# Patient Record
Sex: FEMALE | Race: WHITE | Employment: UNEMPLOYED | URBAN - METROPOLITAN AREA
[De-identification: names, ages, dates, MRNs, and addresses within clinical notes are randomized per-mention and may not be internally consistent; named-entity substitution may affect disease eponyms.]

---

## 2019-12-05 LAB — PAP SMEAR, EXTERNAL: NORMAL

## 2019-12-27 ENCOUNTER — OFFICE VISIT (OUTPATIENT)
Dept: PRIMARY CARE CLINIC | Age: 54
End: 2019-12-27
Payer: COMMERCIAL

## 2019-12-27 VITALS
BODY MASS INDEX: 24.63 KG/M2 | HEIGHT: 63 IN | OXYGEN SATURATION: 100 % | WEIGHT: 139 LBS | TEMPERATURE: 98 F | RESPIRATION RATE: 14 BRPM | DIASTOLIC BLOOD PRESSURE: 68 MMHG | SYSTOLIC BLOOD PRESSURE: 106 MMHG | HEART RATE: 83 BPM

## 2019-12-27 DIAGNOSIS — L71.9 ROSACEA: Primary | ICD-10-CM

## 2019-12-27 DIAGNOSIS — R21 RASH OF FACE: ICD-10-CM

## 2019-12-27 DIAGNOSIS — D68.51 HETEROZYGOUS FACTOR V LEIDEN MUTATION (HCC): ICD-10-CM

## 2019-12-27 DIAGNOSIS — B07.0 PLANTAR WART OF LEFT FOOT: ICD-10-CM

## 2019-12-27 PROCEDURE — 3017F COLORECTAL CA SCREEN DOC REV: CPT | Performed by: FAMILY MEDICINE

## 2019-12-27 PROCEDURE — G8420 CALC BMI NORM PARAMETERS: HCPCS | Performed by: FAMILY MEDICINE

## 2019-12-27 PROCEDURE — G8427 DOCREV CUR MEDS BY ELIG CLIN: HCPCS | Performed by: FAMILY MEDICINE

## 2019-12-27 PROCEDURE — 99202 OFFICE O/P NEW SF 15 MIN: CPT | Performed by: FAMILY MEDICINE

## 2019-12-27 PROCEDURE — G8484 FLU IMMUNIZE NO ADMIN: HCPCS | Performed by: FAMILY MEDICINE

## 2019-12-27 PROCEDURE — 1036F TOBACCO NON-USER: CPT | Performed by: FAMILY MEDICINE

## 2019-12-27 RX ORDER — METRONIDAZOLE 10 MG/G
GEL TOPICAL 2 TIMES DAILY
Qty: 1 TUBE | Refills: 1 | Status: SHIPPED | OUTPATIENT
Start: 2019-12-27 | End: 2021-04-15 | Stop reason: ALTCHOICE

## 2019-12-27 RX ORDER — DOXYCYCLINE HYCLATE 50 MG/1
50 CAPSULE ORAL 2 TIMES DAILY PRN
COMMUNITY
Start: 2019-09-20 | End: 2019-12-27

## 2019-12-27 ASSESSMENT — ENCOUNTER SYMPTOMS
SORE THROAT: 0
SHORTNESS OF BREATH: 0
NAUSEA: 0
ABDOMINAL PAIN: 0
COUGH: 0

## 2020-01-15 ENCOUNTER — TELEPHONE (OUTPATIENT)
Dept: PRIMARY CARE CLINIC | Age: 55
End: 2020-01-15

## 2020-01-31 ENCOUNTER — TELEPHONE (OUTPATIENT)
Dept: PRIMARY CARE CLINIC | Age: 55
End: 2020-01-31

## 2020-01-31 NOTE — TELEPHONE ENCOUNTER
Care Everywhere shows negative mammo at [facility] from 12/27/2019. Can we attribute this to update her HM? Thanks!

## 2020-02-25 PROBLEM — D22.9 MULTIPLE NEVI: Status: ACTIVE | Noted: 2020-02-25

## 2020-07-13 ENCOUNTER — TELEPHONE (OUTPATIENT)
Dept: DERMATOLOGY | Age: 55
End: 2020-07-13

## 2020-07-13 NOTE — TELEPHONE ENCOUNTER
np  Pt c/b [phone]  Pt states:   - has a referral   - needs to be seen for a mole  Please call to discuss thanks

## 2020-07-14 NOTE — TELEPHONE ENCOUNTER
Call from [place] with [facility], patient has been denied for SNF services for: insurance reasons and unable to accepted [facility] at this time.    Was referred to [facility] by Christianne Guerra for Multiple nevi 02/25/2020

## 2020-07-23 NOTE — TELEPHONE ENCOUNTER
Please make sure patient knows that I will address her rosacea if that is the most pressing and schedule a TBSE another visit.     Thanks,  [address] [place]

## 2020-07-23 NOTE — TELEPHONE ENCOUNTER
Dr. Dipti Magaña patient    Patient called back and wanted a face to face. She is scheduled in October.     Call back # [phone]

## 2020-10-14 ENCOUNTER — OFFICE VISIT (OUTPATIENT)
Dept: DERMATOLOGY | Age: 55
End: 2020-10-14
Payer: COMMERCIAL

## 2020-10-14 VITALS — TEMPERATURE: 97.4 F

## 2020-10-14 PROCEDURE — 99243 OFF/OP CNSLTJ NEW/EST LOW 30: CPT | Performed by: DERMATOLOGY

## 2020-10-14 NOTE — PATIENT INSTRUCTIONS
What is rosacea? Rosacea is a skin condition that causes redness and raised, red bumps on the cheeks, nose, chin, forehead, or eyelids. Rosacea is a long-term condition that can get worse over time. Rosacea happens most often in adults ages 27 to 61. What are the symptoms of rosacea? Rosacea affects the cheeks, nose, chin, forehead, or eyelids. Symptoms include:    ?Redness picture 1    ? Blushing easily    ? Raised, red bumps with or without pus in them  - Bumps from rosacea can sometimes look like acne, but they aren't acne. Picture 2    ? Tiny, swollen blood vessels on the skin (called \"telangiectasias\")    ? A burning or gritty feeling in the eyes    ? A red, swollen, and rounded nose    Sometimes, people's symptoms are under control. Other times, symptoms worsen and flare up. There are some things that might make redness on the face worse. Examples include:    ?Eating hot or spicy foods, or drinking hot drinks    ? Drinking alcohol    ? Being too hot or cold    ? Sunlight    ? Stress and other strong emotions    Is there a test for rosacea? No. There is no test. But your doctor or nurse should be able to tell if you have it by learning about your symptoms and doing an exam.    How is rosacea treated? Treatment for rosacea has 2 parts. The treatments do not cure rosacea, but they help control symptoms and prevent flare-ups. Treatment involves both:    ?Medicines - Doctors can use different medicines to treat rosacea. The medicines can come as gels, creams, or lotions that go on your skin, or as pills that you swallow. You will likely need to take or use medicines for a long time. ? Lifestyle changes - To help control your symptoms and prevent flare-ups, you should:    Avoid the common triggers listed above and any other triggers that you know worsen your symptoms    Use mild, unscented face cleansers to wash your face    Wear sunscreen every day    Avoid using products on your face with alcohol, questions. Skin Cancer Prevention: After Your Visit    Skin cancer is the abnormal growth of cells in the skin. It usually appears as a growth that changes in color, shape, or size. This can be a sore that does not heal or a change in a wart or a mole. Skin cancer is almost always curable when found early and treated. So it is important to see your doctor if you have any of these changes in your skin. Skin cancer is the most common type of cancer. It often appears on areas of the body that have been exposed to the sun, such as the head, face, neck, back, chest, or shoulders. Follow-up care is a key part of your treatment and safety. Be sure to make and go to all appointments, and call your doctor if you are having problems. It's also a good idea to know your test results and keep a list of the medicines you take. How can you care for yourself at home?  - Wear a wide-brimmed hat and long sleeves and pants if you are going to be outdoors for a long time. - Avoid the sun between 10 a.m. and 4 p.m., which is the peak time for UV rays. - Wear sunscreen on exposed skin. Make sure the sunscreen blocks ultraviolet rays (both UVA and UVB) and has a sun protection factor (SPF) of at least 30. Use it every day, even when it is cloudy. Some doctors may recommend a higher SPF, such as 48.  - Do not use tanning booths or sunlamps. - Use lip balm or cream that has sun protection factor (SPF) to protect your lips from getting sunburned or getting cold sores. - Wear sunglasses that block UV rays. When should you call for help? Watch closely for changes in your health, and be sure to contact your doctor if:  - You are concerned about any problem areas on your skin. - You notice a change in a mole or skin growth. For example:  a. It gets bigger. b. It develops uneven borders. c. It gets thicker, raised, or worn down. d. It changes color. e. It starts to bleed easily.

## 2020-10-15 NOTE — PROGRESS NOTES
Patient's Name: Marguerite Jenkins  MRN: [id number]  YOB: 1965  Date of Visit: 10/14/2020  Primary Care Provider: Aldo Machado DO  Referring Provider: Sindi Kate*    Subjective:     Chief Complaint   Patient presents with    New Patient     multiple moles        History of Present Illness:  Marguerite Jenkins is a 60 y.o. female who presents in clinic today as a new patient seen in consultation request by Aldo Machado DO   for a full body skin examination and evaluation of moles . They have not undergone a full body skin examination in the past.     Patient reports having moles that have not been changing or bleeding. She also has rosacea that has previously been treated with metrogel. She reports her face recently flared, but she thinks she is sensitive to many things and that is why her face flared. Past Dermatologic History:  Personal history of non melanoma skin cancer: no   Personal history of melanoma: no  Personal history of abnormal/dysplastic moles: no  Personal history of tanning bed use current: no  Personal history of tanning bed use: No  Personal history of blistering sunburns: No  Personal history of extensive sun exposure: Yes  Burns easily: No  Practicing sun protective habits:  Yes using  sunscreen SPF       Social History:   Occupation Current or Former: full time job as    Marital status:   Smoking Status: Never smoker  Children: Yes 1 daughter   Type of outdoor activities if any : none except walking:       Family Skin Disease History:  Patient reports  a family history of non melanoma skin cancer. mother, unsure what type  Patient denies  a family history of abnormal moles  Patient denies  an immediate family history of melanoma.      Past Medical History:  Past Medical History:   Diagnosis Date    [facility]    Rosacea        Past Surgical History:  No past surgical history on file. Past Family History:  Family History   Problem Relation Age of Onset    Cancer Father         prostate cancer    Depression Father     Hearing Loss Father     Stroke Father     Cancer Mother         uterine, breast, squamous cell carcinoma    High Blood Pressure Mother     High Cholesterol Mother     No Known Problems Sister     Cancer Maternal Grandfather 48        skin cancer    Stroke Maternal Grandfather        Allergies: Allergies   Allergen Reactions    Aspirin Other (See Comments)     Reported as child    Doxycycline Nausea Only    Penicillins Hives    Sulfa Antibiotics Nausea And Vomiting       Current Medications:  Current Outpatient Medications   Medication Sig Dispense Refill    metroNIDAZOLE (METROGEL) 1 % gel Apply topically 2 times daily Apply topically daily. 1 Tube 1     No current facility-administered medications for this visit. Review of Systems:  Constitutional: No fevers, chills or recent illness. Skin: Skin:As per HPI AND otherwise no new, bleeding or symptomatic skin lesions      Objective:     Vitals:    10/14/20 0924   Temp: 97.4 °F (36.3 °C)   TempSrc: Temporal     Physical Examination:  General: alert, comfortable, no apparent distress, well-appearing  Psych: alert, oriented and pleasant  Neuro: oriented to person, place, and time  Skin: Areas examined: head including face, lips, conjunctiva and lids, neck, hair/scalp, chest, including breasts and axilla, abdomen, back, buttocks, right upper extremity, left upper extremity, right lower extremity, left lower extremity, left hand, right hand, digits and nails, Right foot, Left foot, toe nails and groin      All areas examined were within normal limits except those listed below with the appropriate assessment and plan    Assessment and Plan (with relevant objective exam findings):     1.  Acne rosacea, erythematotelangiectatic  Location: central face, perinasal and chin  Objective findings: Erythema and telangiectasia with scattered papules pustules    Discussed that this can be chronic and control of the inflammation is key to control of the disease. To do this, triggers must be avoided including sun exposure, extreme temperatures, hot drinks, caffeine, alcohol, spicy foods, irritating skin care products or sunscreens (physical blocking sunscreens with zinc and titanium are best), and any situation that causes flushing. Treatments usually include topical antibiotics and antiinflammatory medications, oral antibiotics, and laser therapy. After discussing r/b/a decision was made to treat with the following:      - Ivermectin (Soolantra) 1% cream daily (samples provided)  - Patient declined oral antibiotics. 2. Lipoma  Location: Rt inferior buttock at the crease  Objective findings: subcutaneous rubbery nodule which is not painful to palpation. Patient was reassured of its benign nature, and that if it grows or becomes tender, it should be reassessed and can be excised. 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 [place] protection is key to prevent further damage. The skin changes are benign and can be treated cosmetically with either IPL or Pulsed Dye Laser      4. Seborrheic Keratosis  Location: back and chest  Objective: Waxy, stuck on keratotic brown and tan  papules. - Discussed the benign nature of these lesions and that there is no malignant potential.  Treatment is destructive or removal which would be considered cosmetic and not covered by insurance. Treatment today: reassurance.       5. Actinodermatosis  In all photo-exposed areas there were numerous light brown, lacy, 3-6 mm macules and some mottled hypo- and hyperpigmentation. This was most prominent on the face, anterior chest, and shoulders. The patient was reassured that these changes do not require treatment, but indicated a significant amount of sun damage in the patient's past. The patient was briefly counseled on the importance of sun protective habits and encouraged to be seen for follow up evaluations in the future. ABCDEs of melanoma were reviewed. 6. Solar Lentigo  Location: sun exposed areas, most prominently on the face, forearms, neck and shoulders      Objective: Numerous lacy brown macules ranging in size from 2-10 mm diameter. The lentigines seen today are benign in character, caused by the sun, and do not require treatment. However, they do occasionally transform into a malignancy, so the patient needs to monitor for changes. They were educated on what a suspicious change would include, change in size or color, and included education on the ABCDs of melanoma. These lesions can also be removed for cosmetic purposes with chemical peels, laser or cryosurgery for a cosmetic fee if the patient desires. 7.  Plantar warts    Location: Left medial heel  Objective: exophytic, verrucous, with dilated capillary loops, rough and scaly hyperkeratotic flesh-colored papules with disruption of skin markings. Discussed that warts are caused by a virus and can be spread with trauma to skin or when the skin is wet. Warts may disappear without treatment in some cases. Discussed treatments that include destructive therapies (freezing), keratolytics (MediPlast, salicylic acid), immunotherapy (Aldara),and possible oral therapy such as L-methionine. It is likely that several treatment types and several sessions will be needed for complete clearance. After discussion of treatment options, the patient declined any treatment.       8. Hand Dermatitis  Location: palms and palmar aspect of fingers   Objective findings: lichenification, leathery texture, scale and fissures     There are many causes of hand rashes. Some of the most common include detergents, soaps, overexposure to water and dry air (especially in the winter), rubber gloves, nd ingredients in skin and personal care products. Excess sweating can also be a problem sometimes. Treatment options were discussed including topical steroids, oral immunosuppressive therapies, and avoidance of triggers. After discussing treatment options, risks and benefits, patient declined treatment and will continue with her own creams she uses. Use soap sparingly, and remove jewelry to wash hands. Pat skin dry and apply moisturizer immediately after drying hands. Thicker moisturizers (vaseline jelly, aquaphor, eucerin cream) that come in a tub will almost always work better than lotions. - The importance of continued surveillance was discussed. Monthly self examinations were encouraged. - Signs of cutaneous malignancy were discussed and they were encouraged to contact me if they note any evolving, bleeding, painful or non-healing lesions. ABCDEs of melanoma also reviewed. - Photoprotection was encouraged and written material on sunscreen provided. I spent a minimum of 45 minutes interacting face-to-face with this patient. More than 50% of this time was spent counseling and coordinating care for the patient  regarding their disease(s) and therapies.  This included reviewing the patients chief complaint, reviewing and clarifying the patients present illness, reviewing and clarifying the patients past medical history, reviewing and clarifying the patients review of systems/social history/family history, reviewing and clarifying the patients current medications/drug allergies, reviewing and highlighting pertinent aspects of any available outside medical records, reviewing any available outside biopsy reports/biopsy slides, examining the patients skin, discussing with the patient my

## 2020-10-16 RX ORDER — IVERMECTIN 10 MG/G
CREAM TOPICAL
Qty: 45 G | Refills: 3 | Status: SHIPPED | OUTPATIENT
Start: 2020-10-16 | End: 2021-04-15 | Stop reason: ALTCHOICE

## 2021-01-22 ENCOUNTER — TELEPHONE (OUTPATIENT)
Dept: DERMATOLOGY | Age: 56
End: 2021-01-22

## 2021-01-22 NOTE — TELEPHONE ENCOUNTER
Called and spoke with patient. I advised patient that Proctor  is not here on that day. I offered patient several visit days to schedule. Patient is unable to schedule on those days due to she works [phone]. Patient stated that she will call back to schedule a visit.

## 2021-02-25 ENCOUNTER — TELEPHONE (OUTPATIENT)
Dept: PRIMARY CARE CLINIC | Age: 56
End: 2021-02-25

## 2021-02-25 NOTE — TELEPHONE ENCOUNTER
Message    Not seen pt in 1 year - please reach out and get scheduled if still seeing us.   ----- Message -----   From: Shiv Rico, [place]: 2/25/2021   9:26 AM EST   To: Anabel Gamboa DO   Subject: Scan                                               The image below was scanned by ARACELI Rdz on 2/25/2021 at 9:26 AM to the following: Curt Lofton [[id number]]:     Left message on patient's voice mail to call back.  She would be due for a physical.

## 2021-04-15 ENCOUNTER — OFFICE VISIT (OUTPATIENT)
Dept: PRIMARY CARE CLINIC | Age: 56
End: 2021-04-15
Payer: COMMERCIAL

## 2021-04-15 VITALS
DIASTOLIC BLOOD PRESSURE: 62 MMHG | OXYGEN SATURATION: 98 % | SYSTOLIC BLOOD PRESSURE: 96 MMHG | WEIGHT: 144.8 LBS | HEIGHT: 63 IN | BODY MASS INDEX: 25.66 KG/M2 | HEART RATE: 69 BPM | TEMPERATURE: 98.1 F

## 2021-04-15 DIAGNOSIS — Z00.00 ANNUAL PHYSICAL EXAM: Primary | ICD-10-CM

## 2021-04-15 DIAGNOSIS — Z12.11 SCREENING FOR COLON CANCER: ICD-10-CM

## 2021-04-15 PROCEDURE — 99396 PREV VISIT EST AGE 40-64: CPT | Performed by: FAMILY MEDICINE

## 2021-04-15 RX ORDER — IVERMECTIN 10 MG/G
CREAM TOPICAL
Qty: 45 G | Refills: 3
Start: 2021-04-15

## 2021-04-15 ASSESSMENT — ENCOUNTER SYMPTOMS
SORE THROAT: 0
NAUSEA: 0
COUGH: 0
ABDOMINAL PAIN: 0
SHORTNESS OF BREATH: 0

## 2021-04-15 ASSESSMENT — PATIENT HEALTH QUESTIONNAIRE - PHQ9
SUM OF ALL RESPONSES TO PHQ QUESTIONS 1-9: 0
1. LITTLE INTEREST OR PLEASURE IN DOING THINGS: 0
SUM OF ALL RESPONSES TO PHQ QUESTIONS 1-9: 0
SUM OF ALL RESPONSES TO PHQ9 QUESTIONS 1 & 2: 0

## 2021-04-15 NOTE — PROGRESS NOTES
[address] [address] [facility]  [address] [place] [id number]  Dept: [phone]  Dept Fax: [phone]     4/15/2021      Kaitlynn Vargas   1965     Chief Complaint   Patient presents with    Annual Exam       HPI  Pt comes in today for annual physical.    PHQ Scores 4/15/2021 2/25/2020   PHQ2 Score 0 0   PHQ9 Score 0 0     Interpretation of Total Score Depression Severity: 1-4 = Minimal depression, 5-9 = Mild depression, 10-14 = Moderate depression, 15-19 = Moderately severe depression, 20-27 = Severe depression     Prior to Visit Medications    Medication Sig Taking? Authorizing Provider   Ivermectin 1 % CREA Apply to affected areas on the face once daily Yes Ignacio Hurt DO       Past Medical History:   Diagnosis Date    Heterozygous factor V Leiden mutation ([facility] 12/27/2019    Rosacea         Social History     Tobacco Use    Smoking status: Never Smoker    Smokeless tobacco: Never Used   Substance Use Topics    Alcohol use: No    Drug use: No        History reviewed. No pertinent surgical history. Allergies   Allergen Reactions    Aspirin Other (See Comments)     Reported as child    Doxycycline Nausea Only    Penicillins Hives    Sulfa Antibiotics Nausea And Vomiting        Family History   Problem Relation Age of Onset    Cancer Father         prostate cancer    Depression Father     Hearing Loss Father     Stroke Father     Cancer Mother         uterine, breast, squamous cell carcinoma    High Blood Pressure Mother     High Cholesterol Mother     No Known Problems Sister     Cancer Maternal Grandfather 48        skin cancer    Stroke Maternal Grandfather         Patient's past medical history, surgical history, family history, medications, and allergies  were all reviewed and updated as appropriate today. Review of Systems   Constitutional: Negative for fever. HENT: Negative for ear pain and sore throat. Respiratory: Negative for cough and shortness of breath. Cardiovascular: Negative for chest pain. Gastrointestinal: Negative for abdominal pain and nausea. Skin: Negative for rash. Neurological: Negative for dizziness and headaches. Hematological: Negative for adenopathy. BP 96/62 (Cuff Size: Medium Adult)   Pulse 69   Temp 98.1 °F (36.7 °C) (Temporal)   Ht 5' 2.75\" (1.594 m)   Wt 144 lb 12.8 oz (65.7 kg)   LMP 08/17/2011   SpO2 98% Comment: room air  Breastfeeding No   BMI 25.85 kg/m²      Physical Exam  Vitals signs reviewed. Constitutional:       General: She is not in acute distress. HENT:      Head: Normocephalic. Nose: Nose normal.      Mouth/Throat:      Mouth: Mucous membranes are moist.   Eyes:      Extraocular Movements: Extraocular movements intact. Pupils: Pupils are equal, round, and reactive to light. Neck:      Musculoskeletal: Neck supple. Cardiovascular:      Rate and Rhythm: Normal rate and regular rhythm. Heart sounds: No murmur. Pulmonary:      Effort: Pulmonary effort is normal.      Breath sounds: Normal breath sounds. No wheezing. Abdominal:      General: Bowel sounds are normal.      Palpations: Abdomen is soft. Tenderness: There is no abdominal tenderness. Musculoskeletal:         General: No swelling or deformity. Lymphadenopathy:      Cervical: No cervical adenopathy. Skin:     General: Skin is warm and dry. Findings: No rash. Neurological:      Mental Status: She is alert and oriented to person, place, and time. Cranial Nerves: No cranial nerve deficit. Psychiatric:         Mood and Affect: Mood normal.         Behavior: Behavior is cooperative. Assessment:  Encounter Diagnoses   Name Primary?  Annual physical exam Yes    Screening for colon cancer        Plan:  1. Annual physical exam  General wellness exam. Reviewed chart for past hx and updated today.  Counseled on age appropriate health guidance and discussed screening recommendations. Vaccinations reviewed and discussed. All questions answered  - CBC Auto Differential; Future  - Comprehensive Metabolic Panel, Fasting; Future  - Lipid, Fasting; Future    2. Screening for colon cancer  Patient is due for colon cancer screening. At this time we have discussed the options that we have and what is recommended to screen. Patient would like to remain somewhat conservative and has agreed to at home FIT testing. I have provided the FIT kit and supplies for patient today, patient will return these to clinic. Patient is aware of what a negative and positive results of this test will mean. Patient is aware of the potential for colonoscopy in the future if this test is positive, and my further recommendations for more long-term colon cancer screening would be a colonoscopy. Return in about 1 year (around 4/15/2022) for Annual Physical.               Petrina Kussmaul, DO     Please note that this chart was generated using dragon dictation software. Although every effort was made to ensure the accuracy of this automated transcription, some errors in transcription may have occurred.

## 2021-04-20 DIAGNOSIS — Z00.00 ANNUAL PHYSICAL EXAM: ICD-10-CM

## 2021-04-20 LAB
BASOPHILS ABSOLUTE: 0.1 K/UL (ref 0–0.2)
BASOPHILS RELATIVE PERCENT: 1 %
EOSINOPHILS ABSOLUTE: 0.2 K/UL (ref 0–0.6)
EOSINOPHILS RELATIVE PERCENT: 4.2 %
HCT VFR BLD CALC: 42.3 % (ref 36–48)
HEMOGLOBIN: 14.2 G/DL (ref 12–16)
LYMPHOCYTES ABSOLUTE: 2.5 K/UL (ref 1–5.1)
LYMPHOCYTES RELATIVE PERCENT: 43.5 %
MCH RBC QN AUTO: 32.6 PG (ref 26–34)
MCHC RBC AUTO-ENTMCNC: 33.6 G/DL (ref 31–36)
MCV RBC AUTO: 97 FL (ref 80–100)
MONOCYTES ABSOLUTE: 0.6 K/UL (ref 0–1.3)
MONOCYTES RELATIVE PERCENT: 10.3 %
NEUTROPHILS ABSOLUTE: 2.3 K/UL (ref 1.7–7.7)
NEUTROPHILS RELATIVE PERCENT: 41 %
PDW BLD-RTO: 13.3 % (ref 12.4–15.4)
PLATELET # BLD: 249 K/UL (ref 135–450)
PMV BLD AUTO: 9.1 FL (ref 5–10.5)
RBC # BLD: 4.37 M/UL (ref 4–5.2)
WBC # BLD: 5.6 K/UL (ref 4–11)

## 2021-04-21 LAB
A/G RATIO: 1.9 (ref 1.1–2.2)
ALBUMIN SERPL-MCNC: 4.6 G/DL (ref 3.4–5)
ALP BLD-CCNC: 55 U/L (ref 40–129)
ALT SERPL-CCNC: 9 U/L (ref 10–40)
ANION GAP SERPL CALCULATED.3IONS-SCNC: 10 MMOL/L (ref 3–16)
AST SERPL-CCNC: 15 U/L (ref 15–37)
BILIRUB SERPL-MCNC: 0.7 MG/DL (ref 0–1)
BUN BLDV-MCNC: 16 MG/DL (ref 7–20)
CALCIUM SERPL-MCNC: 9 MG/DL (ref 8.3–10.6)
CHLORIDE BLD-SCNC: 102 MMOL/L (ref 99–110)
CHOLESTEROL, FASTING: 147 MG/DL (ref 0–199)
CO2: 29 MMOL/L (ref 21–32)
CREAT SERPL-MCNC: 0.9 MG/DL (ref 0.6–1.1)
GFR AFRICAN AMERICAN: >60
GFR NON-AFRICAN AMERICAN: >60
GLOBULIN: 2.4 G/DL
GLUCOSE FASTING: 97 MG/DL (ref 70–99)
HDLC SERPL-MCNC: 56 MG/DL (ref 40–60)
LDL CHOLESTEROL CALCULATED: 79 MG/DL
POTASSIUM SERPL-SCNC: 4.5 MMOL/L (ref 3.5–5.1)
SODIUM BLD-SCNC: 141 MMOL/L (ref 136–145)
TOTAL PROTEIN: 7 G/DL (ref 6.4–8.2)
TRIGLYCERIDE, FASTING: 62 MG/DL (ref 0–150)
VLDLC SERPL CALC-MCNC: 12 MG/DL

## 2021-08-03 ENCOUNTER — TELEPHONE (OUTPATIENT)
Dept: PRIMARY CARE CLINIC | Age: 56
End: 2021-08-03

## 2021-08-09 ENCOUNTER — VIRTUAL VISIT (OUTPATIENT)
Dept: PRIMARY CARE CLINIC | Age: 56
End: 2021-08-09
Payer: COMMERCIAL

## 2021-08-09 DIAGNOSIS — R21 RASH: Primary | ICD-10-CM

## 2021-08-09 DIAGNOSIS — R05.9 COUGH: ICD-10-CM

## 2021-08-09 PROCEDURE — 1036F TOBACCO NON-USER: CPT | Performed by: NURSE PRACTITIONER

## 2021-08-09 PROCEDURE — G8419 CALC BMI OUT NRM PARAM NOF/U: HCPCS | Performed by: NURSE PRACTITIONER

## 2021-08-09 PROCEDURE — 3017F COLORECTAL CA SCREEN DOC REV: CPT | Performed by: NURSE PRACTITIONER

## 2021-08-09 PROCEDURE — G8427 DOCREV CUR MEDS BY ELIG CLIN: HCPCS | Performed by: NURSE PRACTITIONER

## 2021-08-09 PROCEDURE — 99213 OFFICE O/P EST LOW 20 MIN: CPT | Performed by: NURSE PRACTITIONER

## 2021-08-09 ASSESSMENT — ENCOUNTER SYMPTOMS
NAUSEA: 0
SORE THROAT: 0
CHEST TIGHTNESS: 0
COUGH: 1
DIARRHEA: 0
TROUBLE SWALLOWING: 0
EYE ITCHING: 0
RHINORRHEA: 0
SINUS PAIN: 0
BACK PAIN: 0
EYE DISCHARGE: 0
WHEEZING: 0
SINUS PRESSURE: 0
COLOR CHANGE: 0
ABDOMINAL PAIN: 0
SHORTNESS OF BREATH: 0
VOMITING: 0
EYE REDNESS: 0

## 2021-08-09 NOTE — PROGRESS NOTES
back pain and myalgias. Skin: Positive for rash. Negative for color change and pallor. Neurological: Negative for dizziness, weakness, light-headedness and headaches. Hematological: Negative for adenopathy. Does not bruise/bleed easily. Prior to Visit Medications    Medication Sig Taking? Authorizing Provider   Ivermectin 1 % CREA Apply to affected areas on the face once daily  Patient not taking: Reported on 8/9/2021  Sam Bowden DO       Social History     Tobacco Use    Smoking status: Never Smoker    Smokeless tobacco: Never Used   Substance Use Topics    Alcohol use: No    Drug use: No        Allergies   Allergen Reactions    Aspirin Other (See Comments)     Reported as child    Doxycycline Nausea Only    Penicillins Hives    Sulfa Antibiotics Nausea And Vomiting   ,   Past Medical History:   Diagnosis Date    [facility]    Rosacea    , History reviewed. No pertinent surgical history. PHYSICAL EXAMINATION:  [ INSTRUCTIONS:  \"[x]\" Indicates a positive item  \"[]\" Indicates a negative item  -- DELETE ALL ITEMS NOT EXAMINED]  Vital Signs: (As obtained by patient/caregiver or practitioner observation)    Blood pressure-  Heart rate-    Respiratory rate-    Temperature-  Pulse oximetry-     Constitutional: [x] Appears well-developed and well-nourished [x] No apparent distress      [] Abnormal-   Mental status  [x] Alert and awake  [x] Oriented to person/place/time [x]Able to follow commands      Eyes:  EOM    [x]  Normal  [] Abnormal-  Sclera  [x]  Normal  [] Abnormal -         Discharge [x]  None visible  [] Abnormal -    HENT:   [x] Normocephalic, atraumatic.   [] Abnormal   [x] Mouth/Throat: Mucous membranes are moist.     External Ears [x] Normal  [] Abnormal-     Neck: [x] No visualized mass     Pulmonary/Chest: [x] Respiratory effort normal.  [x] No visualized signs of difficulty breathing or respiratory distress        [] Abnormal-      Musculoskeletal:   [] Normal gait with no signs of ataxia         [x] Normal range of motion of neck        [] Abnormal-       Neurological:        [x] No Facial Asymmetry (Cranial nerve 7 motor function) (limited exam to video visit)          [x] No gaze palsy        [] Abnormal-         Skin:        [] No significant exanthematous lesions or discoloration noted on facial skin         [] Abnormal- FEW PINK, FLAT ROUND LESIONS NOTED LEFT INNER KNEE/THIGH. DIFFICULT TO ASSESS VIA VIDEO. NO VESICLES NOTED. NO ERYTHEMA/INFECTION NOTED. Psychiatric:       [x] Normal Affect [] No Hallucinations        [] Abnormal-     Other pertinent observable physical exam findings-     ASSESSMENT/PLAN:  1. Rash  Discussed differentials. Likely exposure  Start on daily antihistamine (claritin). May also try bendaryl cream to lesions. Overall improving. Will call if signs and symptoms do not continue to improve or other signs and symptoms develop. Discussed concerning signs and symptoms such as fever, myalgias, bullseye rash etc due to recent mountain trip. 2. Cough  Continue with conservative tx. Likely viral.   Cough improving. May add muccinex and claritin for PND. Will call if signs and symptoms worsen. Return if symptoms worsen or fail to improve. Richard Hernandez, was evaluated through a synchronous (real-time) audio-video encounter. The patient (or guardian if applicable) is aware that this is a billable service. Verbal consent to proceed has been obtained within the past 12 months. The visit was conducted pursuant to the emergency declaration under the [address] [place] [address], [address] [place] [address] authority and the [employer] General Act. Patient identification was verified, and a caregiver was present when appropriate. The patient was located in a state where the provider was credentialed to provide care.     Total time spent on this encounter: Not billed by time    --CIRA Burch - CNP on 8/9/2021 at 7:20 PM    An electronic signature was used to authenticate this note.

## 2021-08-19 ENCOUNTER — TELEPHONE (OUTPATIENT)
Dept: PRIMARY CARE CLINIC | Age: 56
End: 2021-08-19

## 2021-08-19 NOTE — TELEPHONE ENCOUNTER
Pt stated that she is still not well and has the cough and has lost her voice she said it is because of taking the musinex and wants to know if there is anything else that can be done she stated that she will come in if needed please advise.

## 2021-08-23 ENCOUNTER — VIRTUAL VISIT (OUTPATIENT)
Dept: PRIMARY CARE CLINIC | Age: 56
End: 2021-08-23
Payer: COMMERCIAL

## 2021-08-23 DIAGNOSIS — J40 BRONCHITIS: Primary | ICD-10-CM

## 2021-08-23 DIAGNOSIS — R21 RASH: ICD-10-CM

## 2021-08-23 DIAGNOSIS — R05.9 COUGH: ICD-10-CM

## 2021-08-23 PROCEDURE — 99213 OFFICE O/P EST LOW 20 MIN: CPT | Performed by: NURSE PRACTITIONER

## 2021-08-23 PROCEDURE — G8419 CALC BMI OUT NRM PARAM NOF/U: HCPCS | Performed by: NURSE PRACTITIONER

## 2021-08-23 PROCEDURE — 1036F TOBACCO NON-USER: CPT | Performed by: NURSE PRACTITIONER

## 2021-08-23 PROCEDURE — G8427 DOCREV CUR MEDS BY ELIG CLIN: HCPCS | Performed by: NURSE PRACTITIONER

## 2021-08-23 PROCEDURE — 3017F COLORECTAL CA SCREEN DOC REV: CPT | Performed by: NURSE PRACTITIONER

## 2021-08-23 RX ORDER — AZITHROMYCIN 250 MG/1
TABLET, FILM COATED ORAL
Qty: 1 PACKET | Refills: 0 | Status: SHIPPED | OUTPATIENT
Start: 2021-08-23 | End: 2022-03-28 | Stop reason: CLARIF

## 2021-08-23 SDOH — ECONOMIC STABILITY: FOOD INSECURITY: WITHIN THE PAST 12 MONTHS, YOU WORRIED THAT YOUR FOOD WOULD RUN OUT BEFORE YOU GOT MONEY TO BUY MORE.: NEVER TRUE

## 2021-08-23 SDOH — ECONOMIC STABILITY: TRANSPORTATION INSECURITY
IN THE PAST 12 MONTHS, HAS LACK OF TRANSPORTATION KEPT YOU FROM MEETINGS, WORK, OR FROM GETTING THINGS NEEDED FOR DAILY LIVING?: NO

## 2021-08-23 SDOH — ECONOMIC STABILITY: FOOD INSECURITY: WITHIN THE PAST 12 MONTHS, THE FOOD YOU BOUGHT JUST DIDN'T LAST AND YOU DIDN'T HAVE MONEY TO GET MORE.: NEVER TRUE

## 2021-08-23 SDOH — ECONOMIC STABILITY: TRANSPORTATION INSECURITY
IN THE PAST 12 MONTHS, HAS THE LACK OF TRANSPORTATION KEPT YOU FROM MEDICAL APPOINTMENTS OR FROM GETTING MEDICATIONS?: NO

## 2021-08-23 SDOH — ECONOMIC STABILITY: HOUSING INSECURITY: IN THE LAST 12 MONTHS, HOW MANY PLACES HAVE YOU LIVED?: 1

## 2021-08-23 SDOH — ECONOMIC STABILITY: INCOME INSECURITY: IN THE LAST 12 MONTHS, WAS THERE A TIME WHEN YOU WERE NOT ABLE TO PAY THE MORTGAGE OR RENT ON TIME?: NO

## 2021-08-23 SDOH — ECONOMIC STABILITY: HOUSING INSECURITY
IN THE LAST 12 MONTHS, WAS THERE A TIME WHEN YOU DID NOT HAVE A STEADY PLACE TO SLEEP OR SLEPT IN A SHELTER (INCLUDING NOW)?: NO

## 2021-08-23 ASSESSMENT — ENCOUNTER SYMPTOMS
RHINORRHEA: 0
VOMITING: 0
BACK PAIN: 0
EYE ITCHING: 0
NAUSEA: 0
WHEEZING: 0
SORE THROAT: 0
EYE REDNESS: 0
DIARRHEA: 0
TROUBLE SWALLOWING: 0
COUGH: 1
COLOR CHANGE: 0
SHORTNESS OF BREATH: 0
EYE DISCHARGE: 0
ABDOMINAL PAIN: 0
SINUS PAIN: 0
CHEST TIGHTNESS: 0
SINUS PRESSURE: 0

## 2021-08-23 ASSESSMENT — SOCIAL DETERMINANTS OF HEALTH (SDOH): HOW HARD IS IT FOR YOU TO PAY FOR THE VERY BASICS LIKE FOOD, HOUSING, MEDICAL CARE, AND HEATING?: NOT HARD AT ALL

## 2021-08-23 NOTE — PROGRESS NOTES
2021    TELEHEALTH EVALUATION -- Audio/Visual (During UKP-99 public health emergency)    HPI:    Eliza Day (:  1965) has requested an audio/video evaluation for the following concern(s):    Patient seen virtually for a follow up visit. COUGH:  Seen virtually . Felt like she was improving. Added muccinex and claritin. Still present but is now coughing up yellow and green sputum. Cough is worse at night. No wheezing. No shortness of breath. Will gag at times from coughing. No nasal congestion. No nasal drainage. No sinus pressure. Was taking muccinex but didn't seem to help only dried her up too much. Drinking water/OJ and eating soup. RASH:  Started on BLE after visiting [place]. She has been taking antihistamines and signs and symptoms seemed to be improving. Has very sensitive skin. Will break out in a rash when exposed to multiple things. Has had multiple sensivities to soaps, detergents, lotions  Now has a rash on left neck and is itching. Just noticed today. No further lesions on legs. She has not had allergy testing. ANXIETY:  Has suffered with anxiety and panic attacks for many years. Review of Systems   Constitutional: Negative for activity change, appetite change, chills, fatigue and fever. HENT: Positive for congestion and postnasal drip. Negative for ear pain, rhinorrhea, sinus pressure, sinus pain, sore throat and trouble swallowing. Eyes: Negative for discharge, redness and itching. Respiratory: Positive for cough. Negative for chest tightness, shortness of breath and wheezing. Cardiovascular: Negative for chest pain, palpitations and leg swelling. Gastrointestinal: Negative for abdominal pain, diarrhea, nausea and vomiting. Genitourinary: Negative for difficulty urinating. Musculoskeletal: Negative for back pain and myalgias. Skin: Positive for rash. Negative for color change and pallor.    Neurological: Negative for dizziness, weakness, light-headedness and headaches. Hematological: Negative for adenopathy. Does not bruise/bleed easily. Prior to Visit Medications    Medication Sig Taking? Authorizing Provider   azithromycin (ZITHROMAX) 250 MG tablet Take PO as directed Yes Devorah Quiñones, APRN - CNP   Ivermectin 1 % CREA Apply to affected areas on the face once daily  Patient not taking: Reported on 8/9/2021  Guy Smith, DO       Social History     Tobacco Use    Smoking status: Never Smoker    Smokeless tobacco: Never Used   Substance Use Topics    Alcohol use: No    Drug use: No        Allergies   Allergen Reactions    Aspirin Other (See Comments)     Reported as child    Doxycycline Nausea Only    Penicillins Hives    Sulfa Antibiotics Nausea And Vomiting   ,   Past Medical History:   Diagnosis Date    Heterozygous factor V Leiden mutation ([facility] 12/27/2019    Rosacea    , History reviewed. No pertinent surgical history. PHYSICAL EXAMINATION:  [ INSTRUCTIONS:  \"[x]\" Indicates a positive item  \"[]\" Indicates a negative item  -- DELETE ALL ITEMS NOT EXAMINED]  Vital Signs: (As obtained by patient/caregiver or practitioner observation)    Blood pressure-  Heart rate-    Respiratory rate-    Temperature-  Pulse oximetry-     Constitutional: [x] Appears well-developed and well-nourished [x] No apparent distress      [] Abnormal-   Mental status  [x] Alert and awake  [x] Oriented to person/place/time [x]Able to follow commands      Eyes:  EOM    [x]  Normal  [] Abnormal-  Sclera  [x]  Normal  [] Abnormal -         Discharge [x]  None visible  [] Abnormal -    HENT:   [x] Normocephalic, atraumatic.   [] Abnormal   [x] Mouth/Throat: Mucous membranes are moist.     External Ears [x] Normal  [] Abnormal-     Neck: [x] No visualized mass     Pulmonary/Chest: [x] Respiratory effort normal.  [x] No visualized signs of difficulty breathing or respiratory distress        [] Abnormal-      Musculoskeletal:   [] Normal gait with no signs of ataxia         [x] Normal range of motion of neck        [] Abnormal-       Neurological:        [x] No Facial Asymmetry (Cranial nerve 7 motor function) (limited exam to video visit)          [x] No gaze palsy        [] Abnormal-         Skin:        [x] No significant exanthematous lesions or discoloration noted on facial skin         [] Abnormal- few, small raised erythematous lesions left neck           Psychiatric:       [x] Normal Affect [x] No Hallucinations        [] Abnormal-     Other pertinent observable physical exam findings-     ASSESSMENT/PLAN:  1. Bronchitis  Start on anbx. Continue with muccinex prn. Call if no improvement. - azithromycin (ZITHROMAX) 250 MG tablet; Take PO as directed  Dispense: 1 packet; Refill: 0    2. Cough  See above    3. Rash  Has multiple skin sensitivities. Discussed follow up with dermatologist since she [facility]. Also discussed possible allergy testing in the future. For now she will restart antihistamine and take daily. If no improvement, will call derm for apt. No follow-ups on file. Han Roque, was evaluated through a synchronous (real-time) audio-video encounter. The patient (or guardian if applicable) is aware that this is a billable service. Verbal consent to proceed has been obtained within the past 12 months. The visit was conducted pursuant to the emergency declaration under the [address] [place] [address], [address] [place] [address] authority and the [employer] General Act. Patient identification was verified, and a caregiver was present when appropriate. The patient was located in a state where the provider was credentialed to provide care. Total time spent on this encounter: Not billed by time    --CIRA Helms CNP on 8/23/2021 at 7:49 PM    An electronic signature was used to authenticate this note.

## 2021-08-24 ENCOUNTER — TELEPHONE (OUTPATIENT)
Dept: DERMATOLOGY | Age: 56
End: 2021-08-24

## 2021-08-24 NOTE — TELEPHONE ENCOUNTER
Pt calling states she has a rash on her neck and both of her legs she went to [facility] a few weeks ago not sure if something bite her have some concern last seen was in October of 2020 pls return call back @ 21 [phone] to discuss

## 2021-08-24 NOTE — TELEPHONE ENCOUNTER
Spoke to patient. AN appointment has been scheduled for 08/26/2021. Patient informed to upload new pictures. The other pictures were too blurry.  Patient has verbalized an understanding

## 2021-08-26 ENCOUNTER — VIRTUAL VISIT (OUTPATIENT)
Dept: DERMATOLOGY | Age: 56
End: 2021-08-26
Payer: COMMERCIAL

## 2021-08-26 DIAGNOSIS — L30.0 NUMMULAR DERMATITIS: ICD-10-CM

## 2021-08-26 DIAGNOSIS — B88.0 CHIGGER BITES: Primary | ICD-10-CM

## 2021-08-26 PROCEDURE — G8427 DOCREV CUR MEDS BY ELIG CLIN: HCPCS | Performed by: DERMATOLOGY

## 2021-08-26 PROCEDURE — 1036F TOBACCO NON-USER: CPT | Performed by: DERMATOLOGY

## 2021-08-26 PROCEDURE — G8419 CALC BMI OUT NRM PARAM NOF/U: HCPCS | Performed by: DERMATOLOGY

## 2021-08-26 PROCEDURE — 3017F COLORECTAL CA SCREEN DOC REV: CPT | Performed by: DERMATOLOGY

## 2021-08-26 PROCEDURE — 99213 OFFICE O/P EST LOW 20 MIN: CPT | Performed by: DERMATOLOGY

## 2021-08-26 NOTE — PROGRESS NOTES
TELEHEALTH EVALUATION -- Audio/Visual (During HXSIW-18 public health emergency)    I have explained to Ms. Masood Rea  that a Physical Examination is inherently limited by the nature of telemedicine and that this may lead to delayed or inadequate diagnosis. I also explained that she may require a higher level of care if a diagnosis can not be reasonably established with a virtual visit. Ms. Masood Rea  has provided her Consent to proceed with a Virtual Visit today. Ms. Masood Rea  was personally present on the video link with me today. This visit was completed virtually using the [employer] platform. Due to this being a TeleHealth encounter, evaluation of the following organ systems is limited: Vitals/Constitutional/EENT/Resp/CV/GI//MS/Neuro/Skin/Heme-Lymph-Imm. Patient's Name: Masood Rea  MRN: [id number]  YOB: 1965  Date of Visit: 8/26/2021  Primary Care Provider: CIRA Weinberg CNP  Referring Provider: No ref. provider found    Subjective:   Chief Complaint:   Rash (rash on legs and neck)      History of Present Illness:   Masood Rea is a 60 y.o. female who presents today for evaluation of a rash on legs and neck    Last office visit: 10/15/20    Patient reports developing a rash on her legs for ~ 3 weeks. She first developed the rash in [place] while staying in a cabin and hiking while wearing shorts. The lesions developed suddenly. She denies associated pruritus. She has been using benadryl cream and the rash seems to slowly resolve. Denies any new lesions today. Additionally patient reports a lesion on her neck that developed 2-3 days ago. The rash is slightly itchy, however she reports improvement with the OTC cream.    She denies any other rashes, fever, myalgias or arthralgias.       Past medical and surgical histories, current medications, allergies, social and family histories were reviewed with no change since the last visit Allergies: Allergies   Allergen Reactions    Aspirin Other (See Comments)     Reported as child    Doxycycline Nausea Only    Penicillins Hives    Sulfa Antibiotics Nausea And Vomiting       Current Medications:  Current Outpatient Medications   Medication Sig Dispense Refill    azithromycin (ZITHROMAX) 250 MG tablet Take PO as directed 1 packet 0    Ivermectin 1 % CREA Apply to affected areas on the face once daily (Patient not taking: Reported on 8/9/2021) 45 g 3     No current facility-administered medications for this visit. Review of Systems:  Constitutional: No fevers, chills or recent illness. Skin: Skin:As per HPI AND otherwise no new, bleeding or symptomatic skin lesions      Objective: There were no vitals filed for this visit. Physical Examination:  General: alert, comfortable, no apparent distress, well-appearing  Psych: alert, oriented and pleasant  Neuro: oriented to person, place, and time  Skin: Areas examined: head including face, lips, conjunctiva and lids, neck, hair/scalp, right upper extremity, left upper extremity, right lower extremity, left lower extremity, left hand, right hand and digits and nails      All areas examined were within normal limits except those listed below with the appropriate assessment and plan    Assessment and Plan (with relevant objective exam findings):     1. Favor chigger bites  Location/objective findings: discrete erythematous papules on lower legs. Reviewed photographs in chart  Discussed the nature of this condition. Treatment with topical steroids, however patient not opting to treat at this time. Discussed protective clothing when hiking to avoid exposure to arthropods.       2. Nummular dermatitis  Location: Lt neck  Findings: erythematous coin shaped scaly papules coalescing into a plaque        Plans:  - Discussed triamcinolone ointment, however patient opted to see if it worsens and will call to get prescription    Follow up:  Return visit as needed for change in condition. All questions addressed. Procedure:   No procedure performed        Pursuant to the emergency declaration under the [facility], [facility] waiver authority and the [employer] Act, this Virtual  Visit was conducted, with patient's consent, to reduce the patient's risk of exposure to COVID-19 and provide continuity of care for an established patient. Services were provided through a video synchronous discussion virtually to substitute for in-person clinic visit.           Marsha Candelario MD. MS

## 2021-12-01 ENCOUNTER — TELEPHONE (OUTPATIENT)
Dept: PRIMARY CARE CLINIC | Age: 56
End: 2021-12-01

## 2021-12-01 NOTE — TELEPHONE ENCOUNTER
----- Message from Bianca Sheppard sent at 12/1/2021  4:30 PM EST -----  Subject: Message to Provider    QUESTIONS  Information for Provider? Patient is requesting her lab work orders be put   in for appointment on 4/18/22 please call when orders are put in.   ---------------------------------------------------------------------------  --------------  CALL BACK INFO  What is the best way for the office to contact you? OK to leave message on   voicemail  Preferred Call Back Phone Number? [phone]  ---------------------------------------------------------------------------  --------------  SCRIPT ANSWERS  Relationship to Patient?  Self

## 2021-12-14 ENCOUNTER — TELEPHONE (OUTPATIENT)
Dept: PRIMARY CARE CLINIC | Age: 56
End: 2021-12-14

## 2021-12-14 DIAGNOSIS — Z20.822 EXPOSURE TO COVID-19 VIRUS: Primary | ICD-10-CM

## 2021-12-14 NOTE — TELEPHONE ENCOUNTER
Patient was with her sister and brother in law over Thanksgiving, he was diagnosed with Covid after and passed this last weekend, and today her sister was diagnosed with Covid. The patient would like to get tested and can at [facility] but needs an order.  She would like to be tested just to error on the side of caution     Please let her know when the orders go in     Thank you

## 2021-12-14 NOTE — TELEPHONE ENCOUNTER
Order placed for [facility]. Give patient info for this location. Only testing until 12:30 each day. Will have to wait until tomorrow.

## 2021-12-15 DIAGNOSIS — Z20.822 EXPOSURE TO COVID-19 VIRUS: ICD-10-CM

## 2021-12-16 LAB — SARS-COV-2: NOT DETECTED

## 2021-12-22 ENCOUNTER — IMMUNIZATION (OUTPATIENT)
Dept: PRIMARY CARE CLINIC | Age: 56
End: 2021-12-22
Payer: COMMERCIAL

## 2021-12-22 PROCEDURE — 0004A COVID-19, PFIZER VACCINE 30MCG/0.3ML DOSE: CPT | Performed by: FAMILY MEDICINE

## 2021-12-22 PROCEDURE — 91300 COVID-19, PFIZER VACCINE 30MCG/0.3ML DOSE: CPT | Performed by: FAMILY MEDICINE

## 2021-12-23 ENCOUNTER — TELEPHONE (OUTPATIENT)
Dept: DERMATOLOGY | Age: 56
End: 2021-12-23

## 2021-12-23 NOTE — TELEPHONE ENCOUNTER
Patient is asking if Dr Robert Lares could take pictures of the scar on her chin and neck so she can send it to her insurance co. Dr Robert Lares has not seen her for this and did not do the surgery. I advised patient that I would send a message back but was unsure if Dr Robert Lares could do anything. Please advise. Thank you!

## 2021-12-28 NOTE — TELEPHONE ENCOUNTER
Called and spoke with patient. Patient is requesting for  to take pictures of scars so that she may submit them to her insurance company to settle a claim. I advised the patient if she was not seen by Leonard Deleon for this matter that Im almost certain that  would not be able to take the pictures to have the submitted by her. Patient is asking for any suggestions to what she should do.

## 2022-01-17 NOTE — TELEPHONE ENCOUNTER
Called and spoke with patient. Advised patient that  did not see her for scaring. Patient said that it was okay and was going to talk to her PCP in regards to submitting for this. Patient stated that she really likes  and appreciated the call back.

## 2022-03-28 ENCOUNTER — TELEMEDICINE (OUTPATIENT)
Dept: PRIMARY CARE CLINIC | Age: 57
End: 2022-03-28
Payer: COMMERCIAL

## 2022-03-28 DIAGNOSIS — J06.9 UPPER RESPIRATORY TRACT INFECTION, UNSPECIFIED TYPE: Primary | ICD-10-CM

## 2022-03-28 PROCEDURE — 3017F COLORECTAL CA SCREEN DOC REV: CPT | Performed by: NURSE PRACTITIONER

## 2022-03-28 PROCEDURE — G8419 CALC BMI OUT NRM PARAM NOF/U: HCPCS | Performed by: NURSE PRACTITIONER

## 2022-03-28 PROCEDURE — 1036F TOBACCO NON-USER: CPT | Performed by: NURSE PRACTITIONER

## 2022-03-28 PROCEDURE — G8427 DOCREV CUR MEDS BY ELIG CLIN: HCPCS | Performed by: NURSE PRACTITIONER

## 2022-03-28 PROCEDURE — 99213 OFFICE O/P EST LOW 20 MIN: CPT | Performed by: NURSE PRACTITIONER

## 2022-03-28 PROCEDURE — G8484 FLU IMMUNIZE NO ADMIN: HCPCS | Performed by: NURSE PRACTITIONER

## 2022-03-28 RX ORDER — AZITHROMYCIN 250 MG/1
TABLET, FILM COATED ORAL
Qty: 1 PACKET | Refills: 0 | Status: SHIPPED | OUTPATIENT
Start: 2022-03-28 | End: 2022-08-17 | Stop reason: CLARIF

## 2022-03-28 ASSESSMENT — ENCOUNTER SYMPTOMS
SHORTNESS OF BREATH: 0
RHINORRHEA: 1
NAUSEA: 0
SORE THROAT: 0
TROUBLE SWALLOWING: 0
CHEST TIGHTNESS: 0
DIARRHEA: 0
VOMITING: 0
ABDOMINAL PAIN: 0
COLOR CHANGE: 0
COUGH: 1
WHEEZING: 0
SINUS PRESSURE: 0
SINUS PAIN: 0

## 2022-03-28 NOTE — PROGRESS NOTES
3/28/2022      TELEHEALTH EVALUATION -- Audio/Visual (During KLRKN-79 public health emergency)    HPI:    Simeon Eisenmenger (:  1965) has requested an audio/video evaluation for the following concern(s):    Patient seen virtually for a sick visit     Onset: 5 days ago  Complains of cough, chest congestion  Cough is productive with green sputum. No fevers. No shortness of breath or wheezing. No nasal congestion or sinus pain or pressure. Some runny nose. Taking mucinex    covid vaccines x 3. Review of Systems   Constitutional: Negative for activity change, appetite change, chills, fatigue and fever. HENT: Positive for postnasal drip and rhinorrhea. Negative for congestion, ear pain, sinus pressure, sinus pain, sore throat and trouble swallowing. Respiratory: Positive for cough. Negative for chest tightness, shortness of breath and wheezing. Cardiovascular: Negative for chest pain, palpitations and leg swelling. Gastrointestinal: Negative for abdominal pain, diarrhea, nausea and vomiting. Genitourinary: Negative for difficulty urinating. Musculoskeletal: Negative for myalgias. Skin: Negative for color change, pallor and rash. Neurological: Negative for dizziness, weakness, light-headedness and headaches. Hematological: Negative for adenopathy. Prior to Visit Medications    Medication Sig Taking?  Authorizing Provider   azithromycin (ZITHROMAX) 250 MG tablet Take 2 tabs PO (500mg) on day 1, then 1 tab daily days 2-5 Yes Devorah Quiñones APRN - CNP   Ivermectin 1 % CREA Apply to affected areas on the face once daily Yes Evangelina Dance, DO       Social History     Tobacco Use    Smoking status: Never Smoker    Smokeless tobacco: Never Used   Substance Use Topics    Alcohol use: No    Drug use: No        Allergies   Allergen Reactions    Aspirin Other (See Comments)     Reported as child    Doxycycline Nausea Only    Penicillins Hives    Sulfa Antibiotics Nausea And Vomiting   ,   Past Medical History:   Diagnosis Date    Heterozygous factor V Leiden mutation ([facility] 12/27/2019    Rosacea    , History reviewed. No pertinent surgical history. PHYSICAL EXAMINATION:  [ INSTRUCTIONS:  \"[x]\" Indicates a positive item  \"[]\" Indicates a negative item  -- DELETE ALL ITEMS NOT EXAMINED]  Vital Signs: (As obtained by patient/caregiver or practitioner observation)    Blood pressure-  Heart rate-    Respiratory rate-    Temperature-  Pulse oximetry-     Constitutional: [x] Appears well-developed and well-nourished [x] No apparent distress      [] Abnormal-   Mental status  [x] Alert and awake  [x] Oriented to person/place/time [x]Able to follow commands      Eyes:  EOM    [x]  Normal  [] Abnormal-  Sclera  [x]  Normal  [] Abnormal -         Discharge [x]  None visible  [] Abnormal -    HENT:   [x] Normocephalic, atraumatic. [] Abnormal   [] Mouth/Throat: Mucous membranes are moist.     External Ears [x] Normal  [] Abnormal-     Neck: [x] No visualized mass     Pulmonary/Chest: [x] Respiratory effort normal.  [x] No visualized signs of difficulty breathing or respiratory distress        [] Abnormal-      Musculoskeletal:   [] Normal gait with no signs of ataxia         [x] Normal range of motion of neck        [] Abnormal-       Neurological:        [x] No Facial Asymmetry (Cranial nerve 7 motor function) (limited exam to video visit)          [x] No gaze palsy        [] Abnormal-         Skin:        [x] No significant exanthematous lesions or discoloration noted on facial skin         [] Abnormal-            Psychiatric:       [x] Normal Affect [x] No Hallucinations        [] Abnormal-     Other pertinent observable physical exam findings-     ASSESSMENT/PLAN:  1. Upper respiratory tract infection, unspecified type  Start on anbx. Continue on mucinex. Declines rx for cough med. Will call if signs and symptoms worsen. - azithromycin (ZITHROMAX) 250 MG tablet;  Take 2 tabs PO (500mg) on day 1, then 1 tab daily days 2-5  Dispense: 1 packet; Refill: 0      Return if symptoms worsen or fail to improve. Keep scheduled apt for Physical on 4/19/22. Will complete fasting labs prior to apt. Sky Boo, was evaluated through a synchronous (real-time) audio-video encounter. The patient (or guardian if applicable) is aware that this is a billable service, which includes applicable co-pays. This Virtual Visit was conducted with patient's (and/or legal guardian's) consent. The visit was conducted pursuant to the emergency declaration under the [address] [place] [address], [address] [place] [address] authority and the [employer] and [employer] General Act. Patient identification was verified, and a caregiver was present when appropriate. The patient was located at home in a state where the provider was licensed to provide care. Total time spent on this encounter: Not billed by time    --CIRA Puente CNP on 3/28/2022 at 3:26 PM    An electronic signature was used to authenticate this note.

## 2022-04-11 ENCOUNTER — TELEPHONE (OUTPATIENT)
Dept: PRIMARY CARE CLINIC | Age: 57
End: 2022-04-11

## 2022-04-11 DIAGNOSIS — Z13.220 SCREENING FOR HYPERLIPIDEMIA: Primary | ICD-10-CM

## 2022-04-11 DIAGNOSIS — Z00.00 ANNUAL PHYSICAL EXAM: ICD-10-CM

## 2022-04-11 NOTE — TELEPHONE ENCOUNTER
----- Message from Heather Benson sent at 4/11/2022  3:53 PM EDT -----  Subject: Referral Request    QUESTIONS   Reason for referral request? Patient is requesting lab work orders be   issued so that she can have them completed before her physical on   4/19/2022 340 pm.   Has the physician seen you for this condition before? No   Preferred Specialist (if applicable)? Do you already have an appointment scheduled? Yes  Select Scheduled Date? Select Scheduled Physician? Adria Nageotte   Additional Information for Provider?   ---------------------------------------------------------------------------  --------------  [address]  What is the best way for the office to contact you? OK to leave message on   voicemail  Preferred Call Back Phone Number? [phone]  ---------------------------------------------------------------------------  --------------  SCRIPT ANSWERS  Relationship to Patient?  Self

## 2022-04-11 NOTE — TELEPHONE ENCOUNTER
----- Message from Heather Benson sent at 4/11/2022  3:53 PM EDT -----  Subject: Referral Request    QUESTIONS   Reason for referral request? Patient is requesting lab work orders be   issued so that she can have them completed before her physical on   4/19/2022 340 pm.   Has the physician seen you for this condition before? No   Preferred Specialist (if applicable)? Do you already have an appointment scheduled? Yes  Select Scheduled Date? Select Scheduled Physician? Adria Nageotte   Additional Information for Provider?   ---------------------------------------------------------------------------  --------------  [address]  What is the best way for the office to contact you? OK to leave message on   voicemail  Preferred Call Back Phone Number? [phone]  ---------------------------------------------------------------------------  --------------  SCRIPT ANSWERS  Relationship to Patient?  Self

## 2022-04-12 LAB — MAMMOGRAPHY, EXTERNAL: NORMAL

## 2022-04-13 DIAGNOSIS — Z13.220 SCREENING FOR HYPERLIPIDEMIA: ICD-10-CM

## 2022-04-13 DIAGNOSIS — Z00.00 ANNUAL PHYSICAL EXAM: ICD-10-CM

## 2022-04-13 LAB
A/G RATIO: 2.1 (ref 1.1–2.2)
ALBUMIN SERPL-MCNC: 4.6 G/DL (ref 3.4–5)
ALP BLD-CCNC: 66 U/L (ref 40–129)
ALT SERPL-CCNC: 11 U/L (ref 10–40)
ANION GAP SERPL CALCULATED.3IONS-SCNC: 13 MMOL/L (ref 3–16)
AST SERPL-CCNC: 16 U/L (ref 15–37)
BILIRUB SERPL-MCNC: 0.8 MG/DL (ref 0–1)
BUN BLDV-MCNC: 15 MG/DL (ref 7–20)
CALCIUM SERPL-MCNC: 9.5 MG/DL (ref 8.3–10.6)
CHLORIDE BLD-SCNC: 106 MMOL/L (ref 99–110)
CHOLESTEROL, TOTAL: 151 MG/DL (ref 0–199)
CO2: 26 MMOL/L (ref 21–32)
CREAT SERPL-MCNC: 0.9 MG/DL (ref 0.6–1.1)
GFR AFRICAN AMERICAN: >60
GFR NON-AFRICAN AMERICAN: >60
GLUCOSE BLD-MCNC: 98 MG/DL (ref 70–99)
HCT VFR BLD CALC: 39.3 % (ref 36–48)
HDLC SERPL-MCNC: 62 MG/DL (ref 40–60)
HEMOGLOBIN: 13.4 G/DL (ref 12–16)
LDL CHOLESTEROL CALCULATED: 74 MG/DL
MCH RBC QN AUTO: 32.6 PG (ref 26–34)
MCHC RBC AUTO-ENTMCNC: 34.2 G/DL (ref 31–36)
MCV RBC AUTO: 95.3 FL (ref 80–100)
PDW BLD-RTO: 12.2 % (ref 12.4–15.4)
PLATELET # BLD: 266 K/UL (ref 135–450)
PMV BLD AUTO: 8.6 FL (ref 5–10.5)
POTASSIUM SERPL-SCNC: 4.6 MMOL/L (ref 3.5–5.1)
RBC # BLD: 4.12 M/UL (ref 4–5.2)
SODIUM BLD-SCNC: 145 MMOL/L (ref 136–145)
TOTAL PROTEIN: 6.8 G/DL (ref 6.4–8.2)
TRIGL SERPL-MCNC: 76 MG/DL (ref 0–150)
VLDLC SERPL CALC-MCNC: 15 MG/DL
WBC # BLD: 5.3 K/UL (ref 4–11)

## 2022-04-15 NOTE — PROGRESS NOTES
ENCOUNTER DATE: 4/19/2022     NAME: Marguerite Jenkins   AGE: 60 y.o. GENDER: female   YOB: 1965    Patient Active Problem List   Diagnosis    Rosacea    Heterozygous factor V Leiden mutation ([facility] [address]    Multiple nevi    Anxiety    Family history of breast cancer      Allergies   Allergen Reactions    Aspirin Other (See Comments)     Reported as child    Doxycycline Nausea Only    Penicillins Hives    Sulfa Antibiotics Nausea And Vomiting     Current Outpatient Medications on File Prior to Visit   Medication Sig Dispense Refill    azithromycin (ZITHROMAX) 250 MG tablet Take 2 tabs PO (500mg) on day 1, then 1 tab daily days 2-5 (Patient not taking: Reported on 4/19/2022) 1 packet 0    Ivermectin 1 % CREA Apply to affected areas on the face once daily (Patient not taking: Reported on 4/19/2022) 45 g 3     No current facility-administered medications on file prior to visit. Past Medical History:   Diagnosis Date    [facility]    Rosacea      History reviewed. No pertinent surgical history.    Family History   Problem Relation Age of Onset   Clint Spina Cancer Father         prostate cancer    Depression Father     Hearing Loss Father     Stroke Father     Cancer Mother         uterine, breast, squamous cell carcinoma    High Blood Pressure Mother     High Cholesterol Mother     No Known Problems Sister     Cancer Maternal Grandfather 48        skin cancer    Stroke Maternal Grandfather      Social History     Tobacco Use    Smoking status: Never Smoker    Smokeless tobacco: Never Used   Substance Use Topics    Alcohol use: No      Patient Care Team:  CIRA Camp CNP as PCP - General (Family Nurse Practitioner)  CIRA Harris CNP as PCP - [facility] Empaneled Provider  Moises Rehman as Consulting Physician (Obstetrics & Gynecology)    Chief Complaint   Patient presents with    Annual Exam      HPI:  Marguerite Jenkins is here for a physical    DERM:  Sees Dr Edgar Rowley for rash  Treated as chigger bites with topical steroids  No further problems. GYN:  Follows with gyn Dr Yoel Stringer  -Last pap: NIL, HR HPV- on 12/5/2019    HEME:  H/o factor V Leiden mutation. Advised not to take hormones, not smoke, maintain normal wt etc.   Never had to see hematology. Was told to take blood thinner     ANXIETY:  Doesn't take medication  Has anxiety for years. Feels she manages well. Can talk herself down and through things. Was on zoloft in the past.     MAMMO:  Recent abnl mammo  Additional imaging ok. No new suspicious findings. Bilateral screening due Oct 2022  +FH breast cancer (mother)    COLON CA SCREEN:  Had \"partial colonoscopy\" years ago. LABS:  Recent labs completed. See below    ROS:  Review of Systems   Constitutional: Negative for activity change, appetite change, chills, fatigue and unexpected weight change. HENT: Negative for congestion, ear pain, hearing loss, nosebleeds, postnasal drip, sneezing and sore throat. Respiratory: Negative for cough, chest tightness, shortness of breath and wheezing. Cardiovascular: Negative for chest pain, palpitations and leg swelling. Gastrointestinal: Positive for diarrhea. Negative for abdominal pain, blood in stool, constipation, nausea and vomiting. ?lactose intolerance   Genitourinary: Negative for difficulty urinating and dysuria. Musculoskeletal: Negative for arthralgias, back pain and neck pain. Skin: Negative for color change, pallor and rash. Neurological: Negative for dizziness, tremors, numbness and headaches. Hematological: Does not bruise/bleed easily. H/o Factor V   Psychiatric/Behavioral: Negative for agitation, dysphoric mood and sleep disturbance. The patient is nervous/anxious.        VITALS:  /70 (Site: Right Upper Arm, Position: Sitting, Cuff Size: Medium Adult)   Pulse 79   Temp 97.8 °F (36.6 °C) (Infrared)   Ht 5' 2.75\" (1.594 m)   Wt deficit present. Mental Status: She is alert and oriented to person, place, and time. Motor: No weakness. Gait: Gait normal.   Psychiatric:         Mood and Affect: Mood normal.         Behavior: Behavior normal.         Thought Content: Thought content normal.         Judgment: Judgment normal.      Comments: Rapid speech, anxious        Lab Results   Component Value Date    WBC 5.3 04/13/2022    HGB 13.4 04/13/2022    HCT 39.3 04/13/2022    MCV 95.3 04/13/2022     04/13/2022     Lab Results   Component Value Date     04/13/2022    K 4.6 04/13/2022     04/13/2022    CO2 26 04/13/2022    BUN 15 04/13/2022    CREATININE 0.9 04/13/2022    GLUCOSE 98 04/13/2022    CALCIUM 9.5 04/13/2022    PROT 6.8 04/13/2022    LABALBU 4.6 04/13/2022    BILITOT 0.8 04/13/2022    ALKPHOS 66 04/13/2022    AST 16 04/13/2022    ALT 11 04/13/2022    LABGLOM >60 04/13/2022    GFRAA >60 04/13/2022    AGRATIO 2.1 04/13/2022    GLOB 2.4 04/20/2021       Lab Results   Component Value Date    CHOL 151 04/13/2022    CHOL 144 11/21/2018    CHOL 118 11/30/2012     Lab Results   Component Value Date    TRIG 76 04/13/2022    TRIG 82 11/21/2018    TRIG 77 11/30/2012     Lab Results   Component Value Date    HDL 62 (H) 04/13/2022    HDL 56 04/20/2021    HDL 63 11/21/2018     Lab Results   Component Value Date    LDLCALC 74 04/13/2022    LDLCALC 79 04/20/2021    LDLCALC 65 11/21/2018     Lab Results   Component Value Date    LABVLDL 15 04/13/2022    LABVLDL 12 04/20/2021    LABVLDL 15 11/30/2012     No results found for: CHOLHDLRATIO      ASSESSMENT/PLAN:  1. Annual physical exam  Recent labs reviewed and discussed. Discussed shingles vaccine. Declines today  Mammogram up to date  Colon cancer screening due. Willing to do cologuard. Follows with gyn for pap. 2. Screening for colon cancer  - Fecal DNA Colorectal cancer screening (Cologuard)    3. Heterozygous factor V Leiden mutation ([facility]    4.  Anxiety  Stable without medications    5.  Family history of breast cancer  mammo utd      Return in about 1 year (around 4/19/2023) for physical.     Electronically signed by CIRA Bro CNP on 4/19/2022 at 2:15 PM

## 2022-04-19 ENCOUNTER — OFFICE VISIT (OUTPATIENT)
Dept: PRIMARY CARE CLINIC | Age: 57
End: 2022-04-19
Payer: COMMERCIAL

## 2022-04-19 VITALS
WEIGHT: 143.8 LBS | SYSTOLIC BLOOD PRESSURE: 112 MMHG | BODY MASS INDEX: 25.48 KG/M2 | HEART RATE: 79 BPM | HEIGHT: 63 IN | OXYGEN SATURATION: 98 % | TEMPERATURE: 97.8 F | DIASTOLIC BLOOD PRESSURE: 70 MMHG

## 2022-04-19 DIAGNOSIS — F41.9 ANXIETY: ICD-10-CM

## 2022-04-19 DIAGNOSIS — Z12.11 SCREENING FOR COLON CANCER: ICD-10-CM

## 2022-04-19 DIAGNOSIS — Z80.3 FAMILY HISTORY OF BREAST CANCER: ICD-10-CM

## 2022-04-19 DIAGNOSIS — D68.51 HETEROZYGOUS FACTOR V LEIDEN MUTATION (HCC): ICD-10-CM

## 2022-04-19 DIAGNOSIS — Z00.00 ANNUAL PHYSICAL EXAM: Primary | ICD-10-CM

## 2022-04-19 PROCEDURE — 99396 PREV VISIT EST AGE 40-64: CPT | Performed by: NURSE PRACTITIONER

## 2022-04-19 ASSESSMENT — ENCOUNTER SYMPTOMS
SORE THROAT: 0
CONSTIPATION: 0
WHEEZING: 0
NAUSEA: 0
BACK PAIN: 0
ABDOMINAL PAIN: 0
VOMITING: 0
CHEST TIGHTNESS: 0
DIARRHEA: 1
BLOOD IN STOOL: 0
COLOR CHANGE: 0
SHORTNESS OF BREATH: 0
COUGH: 0

## 2022-04-19 ASSESSMENT — PATIENT HEALTH QUESTIONNAIRE - PHQ9
SUM OF ALL RESPONSES TO PHQ QUESTIONS 1-9: 0
2. FEELING DOWN, DEPRESSED OR HOPELESS: 0
1. LITTLE INTEREST OR PLEASURE IN DOING THINGS: 0
SUM OF ALL RESPONSES TO PHQ9 QUESTIONS 1 & 2: 0

## 2022-04-21 PROBLEM — Z80.3 FAMILY HISTORY OF BREAST CANCER: Status: ACTIVE | Noted: 2022-04-21

## 2022-08-16 NOTE — PROGRESS NOTES
ENCOUNTER DATE: 8/17/2022     NAME: Olinda Harris   AGE: 59 y.o. GENDER: female   YOB: 1965    Patient Active Problem List   Diagnosis    Rosacea    Heterozygous factor V Leiden mutation ([address] [address]    Multiple nevi    Anxiety    Family history of breast cancer      Allergies   Allergen Reactions    Aspirin Other (See Comments)     Reported as child    Doxycycline Nausea Only    Penicillins Hives    Sulfa Antibiotics Nausea And Vomiting     Current Outpatient Medications on File Prior to Visit   Medication Sig Dispense Refill    Ivermectin 1 % CREA Apply to affected areas on the face once daily 45 g 3     No current facility-administered medications on file prior to visit. Social History     Tobacco Use    Smoking status: Never    Smokeless tobacco: Never   Substance Use Topics    Alcohol use: No      CARE TEAM  Patient Care Team:  CIRA Humphrey CNP as PCP - General (Family Nurse Practitioner)  CIRA Wyatt CNP as PCP - [facility] Empaneled Provider  Nestor Mustafa as Consulting Physician (Obstetrics & Gynecology)    Chief Complaint   Patient presents with    Insect Bite     On  left  arm  right knee        HPI:   Patient is here for a problem visit    Complains of wasp sting x 1 wk ago on her left upper arm  Applied ice and benadryl PO and benadryl cream.  Now is red. No further pain. Now itching. Redness isn't going away  Also has sting on right upper leg. No redness. Just itches. ROS:  Review of Systems   Constitutional:  Negative for chills, fatigue and fever. HENT:  Negative for trouble swallowing. Respiratory:  Negative for cough, chest tightness, shortness of breath and wheezing. Cardiovascular:  Negative for chest pain and leg swelling. Gastrointestinal:  Negative for diarrhea, nausea and vomiting. Skin:  Positive for wound. Negative for rash. Neurological:  Negative for dizziness, light-headedness and headaches. Hematological:  Negative for adenopathy. VITALS:  /78   Pulse 72   Temp 98.2 °F (36.8 °C) (Oral)   Ht 5' 2.75\" (1.594 m)   Wt 146 lb 9.6 oz (66.5 kg)   LMP 08/17/2011   SpO2 98%   BMI 26.18 kg/m²      PE:  Physical Exam  Vitals and nursing note reviewed. Constitutional:       General: She is not in acute distress. Appearance: Normal appearance. She is well-developed and normal weight. She is not diaphoretic. Cardiovascular:      Rate and Rhythm: Normal rate and regular rhythm. Heart sounds: Normal heart sounds. No murmur heard. No friction rub. Pulmonary:      Effort: Pulmonary effort is normal. No respiratory distress. Breath sounds: Normal breath sounds. No wheezing, rhonchi or rales. Skin:     General: Skin is warm and dry. Findings: No rash. Neurological:      Mental Status: She is alert and oriented to person, place, and time. Motor: No weakness. Gait: Gait normal.   Psychiatric:         Mood and Affect: Mood normal.         Behavior: Behavior normal.        ASSESSMENT/PLAN:  1. Local reaction to insect sting, accidental or unintentional, initial encounter  Trial of topical steroid cream  May take zyrtec in am  and benadryl in pm if needed. Will call if signs and symptoms worsen  - triamcinolone (KENALOG) 0.1 % cream; Apply topically 2 times daily. Dispense: 45 g; Refill: 0        Return if symptoms worsen or fail to improve.      Electronically signed by CIRA Thomas CNP on 8/17/2022 at 12:52 PM

## 2022-08-17 ENCOUNTER — OFFICE VISIT (OUTPATIENT)
Dept: PRIMARY CARE CLINIC | Age: 57
End: 2022-08-17
Payer: COMMERCIAL

## 2022-08-17 VITALS
TEMPERATURE: 98.2 F | HEART RATE: 72 BPM | HEIGHT: 63 IN | SYSTOLIC BLOOD PRESSURE: 120 MMHG | WEIGHT: 146.6 LBS | DIASTOLIC BLOOD PRESSURE: 78 MMHG | BODY MASS INDEX: 25.98 KG/M2 | OXYGEN SATURATION: 98 %

## 2022-08-17 DIAGNOSIS — T63.481A LOCAL REACTION TO INSECT STING, ACCIDENTAL OR UNINTENTIONAL, INITIAL ENCOUNTER: Primary | ICD-10-CM

## 2022-08-17 PROCEDURE — 3017F COLORECTAL CA SCREEN DOC REV: CPT | Performed by: NURSE PRACTITIONER

## 2022-08-17 PROCEDURE — 1036F TOBACCO NON-USER: CPT | Performed by: NURSE PRACTITIONER

## 2022-08-17 PROCEDURE — 99213 OFFICE O/P EST LOW 20 MIN: CPT | Performed by: NURSE PRACTITIONER

## 2022-08-17 PROCEDURE — G8427 DOCREV CUR MEDS BY ELIG CLIN: HCPCS | Performed by: NURSE PRACTITIONER

## 2022-08-17 PROCEDURE — G8419 CALC BMI OUT NRM PARAM NOF/U: HCPCS | Performed by: NURSE PRACTITIONER

## 2022-08-17 RX ORDER — TRIAMCINOLONE ACETONIDE 1 MG/G
CREAM TOPICAL
Qty: 45 G | Refills: 0 | Status: SHIPPED | OUTPATIENT
Start: 2022-08-17

## 2022-08-17 ASSESSMENT — PATIENT HEALTH QUESTIONNAIRE - PHQ9
9. THOUGHTS THAT YOU WOULD BE BETTER OFF DEAD, OR OF HURTING YOURSELF: 0
2. FEELING DOWN, DEPRESSED OR HOPELESS: 0
8. MOVING OR SPEAKING SO SLOWLY THAT OTHER PEOPLE COULD HAVE NOTICED. OR THE OPPOSITE, BEING SO FIGETY OR RESTLESS THAT YOU HAVE BEEN MOVING AROUND A LOT MORE THAN USUAL: 0
6. FEELING BAD ABOUT YOURSELF - OR THAT YOU ARE A FAILURE OR HAVE LET YOURSELF OR YOUR FAMILY DOWN: 0
SUM OF ALL RESPONSES TO PHQ QUESTIONS 1-9: 0
1. LITTLE INTEREST OR PLEASURE IN DOING THINGS: 0
SUM OF ALL RESPONSES TO PHQ QUESTIONS 1-9: 0
7. TROUBLE CONCENTRATING ON THINGS, SUCH AS READING THE NEWSPAPER OR WATCHING TELEVISION: 0
SUM OF ALL RESPONSES TO PHQ9 QUESTIONS 1 & 2: 0
SUM OF ALL RESPONSES TO PHQ QUESTIONS 1-9: 0
5. POOR APPETITE OR OVEREATING: 0
3. TROUBLE FALLING OR STAYING ASLEEP: 0
10. IF YOU CHECKED OFF ANY PROBLEMS, HOW DIFFICULT HAVE THESE PROBLEMS MADE IT FOR YOU TO DO YOUR WORK, TAKE CARE OF THINGS AT HOME, OR GET ALONG WITH OTHER PEOPLE: 0
4. FEELING TIRED OR HAVING LITTLE ENERGY: 0
SUM OF ALL RESPONSES TO PHQ QUESTIONS 1-9: 0

## 2022-08-17 ASSESSMENT — ENCOUNTER SYMPTOMS
NAUSEA: 0
TROUBLE SWALLOWING: 0
CHEST TIGHTNESS: 0
SHORTNESS OF BREATH: 0
COUGH: 0
VOMITING: 0
WHEEZING: 0
DIARRHEA: 0

## 2022-08-17 ASSESSMENT — ANXIETY QUESTIONNAIRES
IF YOU CHECKED OFF ANY PROBLEMS ON THIS QUESTIONNAIRE, HOW DIFFICULT HAVE THESE PROBLEMS MADE IT FOR YOU TO DO YOUR WORK, TAKE CARE OF THINGS AT HOME, OR GET ALONG WITH OTHER PEOPLE: NOT DIFFICULT AT ALL
7. FEELING AFRAID AS IF SOMETHING AWFUL MIGHT HAPPEN: 0
4. TROUBLE RELAXING: 0
GAD7 TOTAL SCORE: 0
6. BECOMING EASILY ANNOYED OR IRRITABLE: 0
2. NOT BEING ABLE TO STOP OR CONTROL WORRYING: 0
5. BEING SO RESTLESS THAT IT IS HARD TO SIT STILL: 0
1. FEELING NERVOUS, ANXIOUS, OR ON EDGE: 0
3. WORRYING TOO MUCH ABOUT DIFFERENT THINGS: 0